# Patient Record
Sex: FEMALE | Race: WHITE | HISPANIC OR LATINO | Employment: FULL TIME | URBAN - METROPOLITAN AREA
[De-identification: names, ages, dates, MRNs, and addresses within clinical notes are randomized per-mention and may not be internally consistent; named-entity substitution may affect disease eponyms.]

---

## 2022-09-07 RX ORDER — LISINOPRIL 5 MG/1
5 TABLET ORAL DAILY
COMMUNITY
Start: 2022-02-04 | End: 2022-09-30

## 2022-09-07 RX ORDER — BIOTIN 10 MG
TABLET ORAL DAILY
COMMUNITY
End: 2023-03-30

## 2022-09-20 DIAGNOSIS — N18.2 CHRONIC KIDNEY DISEASE, STAGE II (MILD): Primary | ICD-10-CM

## 2022-09-29 PROBLEM — M32.14: Status: ACTIVE | Noted: 2022-09-29

## 2022-09-29 PROBLEM — R80.9 NON-NEPHROTIC RANGE PROTEINURIA: Status: ACTIVE | Noted: 2022-09-29

## 2022-09-29 PROBLEM — N18.2 CHRONIC KIDNEY DISEASE, STAGE II (MILD): Status: ACTIVE | Noted: 2022-09-29

## 2022-09-29 NOTE — PROGRESS NOTES
Pt Name:  Isidoro Faustin  Pt :  1990  Pt MRN:  [id number]    Date: 2022    Reason for visit:   Isidoro Faustin is a 32 y.o.  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     Chief Complaint:   The patient denies any complaints today.    HPI:  The patient is being seen today for follow up CKD and the status of her kidney function. Since the last visit, patient reports/medical record review reveals she is followed by Rheumatology and was last seen on 22 and was recommended to start on Plaquenil 200 mg po BID but pt. Unwilling to do so.  Dr. Lemus also suggested she have an ECHO to evaluate for PH but she was also no willing to have the ECHO either.  She has been attending a weight loss clinic. She was tried on Saxenda but was only able to tolerate it for approximately 1 week.  She is now trying diet and exercise.     The patient denies fatigue, weakness, poor appetite, metallic taste, nausea, cramping, chest pain, palpitations, SOB, orthopnea, PND, edema, trouble concentrating, decreased urination.      Home BPs when checked are <130/80 per patient. The patient is following a low sodium diet. The patient is avoiding NSAIDs. The patient is drinking 64 oz of water daily.     Since last visit on 22 patient reports above noted changes in medical history.    She was initially diagnosed with Lupus in .     History:   Past Medical History:   Diagnosis Date    Abnormal biopsy of kidney     systemic lupus erythematosus    Anemia, unspecified     Lupus nephritis, ISN/RPS class III     Rheumatic fever without heart involvement     Rheumatoid arthritis, unspecified     Systemic lupus erythematosus, organ or system involvement unspecified     Tubulo-interstitial nephritis, not specified as acute or chronic      History reviewed. No pertinent surgical history.  Family History   Problem  "Relation Age of Onset    Cancer Mother         Ovarian    Breast cancer Maternal Grandmother     Cancer Maternal Grandfather     Kidney disease Paternal Grandfather      Social History     Substance and Sexual Activity   Alcohol Use Not Currently     Social History     Substance and Sexual Activity   Drug Use Not Currently     Social History     Substance and Sexual Activity   Sexual Activity Yes     reports being sexually active.  Social History     Tobacco Use   Smoking Status Never   Smokeless Tobacco Never       Allergies:  Review of patient's allergies indicates:  No Known Allergies      Current Outpatient Medications:     biotin 10 mg Tab, Take by mouth once daily., Disp: , Rfl:     ROS:  Review of Systems   Constitutional:  Negative for activity change and appetite change.   HENT:  Negative for hearing loss.    Eyes:  Negative for visual disturbance.   Respiratory:  Negative for shortness of breath and wheezing.    Cardiovascular:  Negative for chest pain.   Gastrointestinal:  Negative for abdominal pain, diarrhea, nausea and vomiting.   Genitourinary:  Negative for decreased urine volume, dysuria, flank pain, frequency and urgency.   Neurological:  Negative for dizziness, weakness and headaches.     Physical Exam:   Vitals:   Vitals:    09/30/22 0913   BP: 123/86   Pulse: 77   SpO2: 99%   Weight: 89 kg (196 lb 3.2 oz)   Height: 5' 6" (1.676 m)   PainSc: 0-No pain     Body mass index is 31.67 kg/m².    Physical Exam  Vitals reviewed.   Constitutional:       General: She is not in acute distress.     Appearance: Normal appearance.   HENT:      Head: Normocephalic and atraumatic.      Mouth/Throat:      Mouth: Mucous membranes are moist.   Eyes:      Extraocular Movements: Extraocular movements intact.      Pupils: Pupils are equal, round, and reactive to light.   Cardiovascular:      Rate and Rhythm: Normal rate and regular rhythm.      Heart sounds: No murmur heard.  Pulmonary:      Effort: Pulmonary effort " is normal.      Breath sounds: No wheezing, rhonchi or rales.   Musculoskeletal:         General: No swelling.   Skin:     General: Skin is warm and dry.   Neurological:      Mental Status: She is alert and oriented to person, place, and time.   Psychiatric:         Mood and Affect: Mood normal.         Behavior: Behavior normal.       Labs/Tests:  Lab Results   Component Value Date    BUN 22 06/23/2022    CREATININE 0.86 06/23/2022    CREATININE 0.88 12/22/2021    GLUCOSE Negative 12/22/2021    EGFRNONAA 89 (L) 06/23/2022    EGFRNONAA 87 (L) 12/22/2021    ESTGFRAFRICA >90 06/23/2022    ESTGFRAFRICA >90 12/22/2021    HGB 12.9 06/23/2022    IRON 59 02/17/2022    TIBC 258 02/17/2022    FERRITIN 560.9 (H) 02/17/2022     Contains abnormal data Comprehensive metabolic panel  Order: [id number]   Ref Range & Units 2 d ago   ALBUMIN 3.7 - 4.9 g/dL 3.4 Low     Comment: Verified By Repeat Analysis   ALK PHOS 40 - 130 u/L 56.0    SGPT (ALT) 10 - 70 u/L 34.0    SGOT (AST) 10 - 40 u/L 31.0    BUN 7 - 25 mg/dL 16.0    CALCIUM 8.5 - 10.5 mg/dL 8.6    CHLORIDE 98 - 108 mmol/L 106.0    CO2 20 - 30 mmol/L 25.0    CREATININE 0.5 - 1.1 mg/dL 0.9    GLOBULIN 2 - 3.5 g/dL 3.8 High     GLUCOSE 65 - 99 mg/dL 74.0    Comment:    Normal Range reflects ADA recommendation.   Fasting? UNKNOWN   POTASSIUM 3.5 - 5.2 mmol/L 4.3    SODIUM 135 - 145 mmol/L 139.0    BILI TOTAL 0.2 - 1.3 mg/dL 0.5    PROTEIN, TOT 6.3 - 8.3 g/dL 7.2    A/G RATIO 1.1 - 2 Ratio 0.9 Low     ANION GAP 6 - 14 mmol/L 8.0    EGFR IF AFA >60.0 mL/min/1.73m^2 84.3    EGFR NOT AFA >60.0 mL/min/1.73m^2 84.3    BUN/CREAT Ratio 17.4      Component 09/27/22  01/11/22  07/08/21  10/08/20  02/28/19 05/11/18   PHOS P/S 3.3  3.5  3.8  3.6  4.1  4.2      CBC w/ diff  Order: [id number]   Ref Range & Units 2 d ago   WBC 3.8 - 10.8 K/UL 4.6    RBC 3.8 - 5.1 M/UL 4.5    HGB 11.5 - 15.5 g/dL 13.7    HCT 34 - 46 % 43.5    MCV 80 - 100 fL 96.0    MCH 27 - 33 pg 30.2    MCHC 32 - 36 g/dL 31.5 Low      RDW 11 - 15 % 12.8     - 400 K/.0      PTH, INTACT 16 - 77 pg/mL 64         Ref Range & Units 2 d ago   CREATIN UR 20 - 275 mg/dL 123    PROTEIN, TOTAL, RANDOM URINE 5 - 24 mg/dL 140 High     Comment: Test Performed at:   [facility] [address]  [id number]     LAUREN BAXTER MD   Fasting? UNKNOWN   PROTEIN/CREATININE RATIO 24 - 184 mg/g creat 1,138 High     PROTEIN/CREATININE RATIO 0.024 - 0.184 mg/mg creat 1.138 High           Diagnosis:  1. SLE glomerulonephritis syndrome, WHO class III  Renal Function Panel    Protein/Creatinine Ratio, Urine    Renal Function Panel    Protein/Creatinine Ratio, Urine      2. Chronic kidney disease, stage II (mild)  Hemoglobin    Renal Function Panel    PTH, Intact    Protein/Creatinine Ratio, Urine    Vitamin D    Hemoglobin    Renal Function Panel    PTH, Intact    Protein/Creatinine Ratio, Urine    Vitamin D      3. Non-nephrotic range proteinuria  Protein/Creatinine Ratio, Urine    Protein/Creatinine Ratio, Urine           Plan:  SLE glomerulonephritis syndrome, WHO class III  She is followed by Rheumatology and is unwilling to take Plaquenil 200 mg po BID as ordered / suggested.  She is encouraged to continue to f/u Rheumatology.     Chronic kidney disease, stage II (mild)  Serum Creatinine 0.9 / eGFR 84 - without Anemia - without SHPT -  Avoid NSAIDS, Assure 64 oz of water daily, Meds per med list above     Non-nephrotic range proteinuria  PROTEIN/CREATININE RATIO 0.024 - 0.184 mg/mg creat 1.138 High       She is unwilling to take the ACE inhibitor.  Patient has been re-educated on the indication and need to take an ACE.         My reconciliation of medication should not condone or support use of medications that have been previously prescribed for patients by other providers.  I am only documenting the current medications patients is taking and may change doses, add or discontinue medications based on information  provided by the patient.     The patient has been provided with their current level of kidney function including eGFR and creatinine.    We discussed the potential for common complications of CKD including anemia, electrolyte abnormalities, abnormal fluid balance, mineral bone disease and malnutrition.    We discussed strategies to slow the progression of their kidney disease including:  Avoid nephrotoxic agents. Avoid over-the-counter and prescription NSAIDs (Ibuprofren, Motrin, Naproxyn, Aleve, Mobic, Celebrex, Toradol, Advil). All of these can worsen kidney function, elevate BP, cause fluid retention/swelling and elevate potassium. Avoid iodine contrast agents and gadolinium, which can worsen kidney function and/or cause kidney failure. Avoid phosphosoda bowel preps which can worsen kidney function.  Work to improve modifiable risk factors. Aim for good control of blood glucose without episodes of hypoglycemia. Notify the provider managing your diabetes if your blood glucose < 60. Aim for good blood pressure control without episodes of hypotension. Call the office if your systolic blood pressure is consistently < 110. Aim for good control of your cholesterol.  AIC goal <7.0  BP goal <130/80        Keeping these in goal range will help prevent progression of cardiovascular disease            and chronic kidney disease.    We discussed dietary modifications:  Low sodium diet: 2 gm/d or less  Limit/avoid high potassium foods  Avoid potassium containing salt substitutes  Limit/avoid high phosphorus foods  Limit daily protein intake to 0.8-1 gm/kg of your ideal body weight.    We discussed lifestyle modifications:  Make sure you are drinking plenty of fluids--64 ounces (1/2 gallon) daily  Exercise at least 30 minutes 5 x per week (total 150 minutes per week), example brisk walking  Achieve and maintain a healthy weight (BMI 20-25)  Limit alcohol consumption to <2 drinks per day  Stop smoking  Make sure you stay  current on important vaccines-- pneumonia vaccines (Pneumovax and Prevnar), flu vaccine, Hepatitis B (especially patients nearing renal replacement therapy and planning hemodialysis) and Covid-19 vaccine.     Recommendations:  Monitor your BP at home daily and record.  Bring readings to your next appt.  Call the office if your BP is persistently >130/80.  Seek urgent medical attention with signs and symptoms of uremia - extreme weakness, fatigue, confusion, anorexia, metallic taste in mouth, hiccoughs, cramping, itching, chest pain, swelling, or trouble sleeping.    Follow Up:   Follow up in about 6 months (around 3/30/2023).

## 2022-09-29 NOTE — ASSESSMENT & PLAN NOTE
PROTEIN/CREATININE RATIO 0.024 - 0.184 mg/mg creat 1.138 High       She is unwilling to take the ACE inhibitor.  Patient has been re-educated on the indication and need to take an ACE.

## 2022-09-29 NOTE — ASSESSMENT & PLAN NOTE
Serum Creatinine 0.9 / eGFR 84 - without Anemia - without SHPT -  Avoid NSAIDS, Assure 64 oz of water daily, Meds per med list above

## 2022-09-29 NOTE — ASSESSMENT & PLAN NOTE
She is followed by Rheumatology and is unwilling to take Plaquenil 200 mg po BID as ordered / suggested.  She is encouraged to continue to f/u Rheumatology.

## 2022-09-30 ENCOUNTER — OFFICE VISIT (OUTPATIENT)
Dept: NEPHROLOGY | Facility: CLINIC | Age: 32
End: 2022-09-30
Payer: COMMERCIAL

## 2022-09-30 VITALS
OXYGEN SATURATION: 99 % | HEIGHT: 66 IN | HEART RATE: 77 BPM | WEIGHT: 196.19 LBS | DIASTOLIC BLOOD PRESSURE: 86 MMHG | SYSTOLIC BLOOD PRESSURE: 123 MMHG | BODY MASS INDEX: 31.53 KG/M2

## 2022-09-30 DIAGNOSIS — M32.14 SLE GLOMERULONEPHRITIS SYNDROME, WHO CLASS III: Primary | ICD-10-CM

## 2022-09-30 DIAGNOSIS — N18.2 CHRONIC KIDNEY DISEASE, STAGE II (MILD): ICD-10-CM

## 2022-09-30 DIAGNOSIS — R80.9 NON-NEPHROTIC RANGE PROTEINURIA: ICD-10-CM

## 2022-09-30 PROCEDURE — 3066F PR DOCUMENTATION OF TREATMENT FOR NEPHROPATHY: ICD-10-PCS | Mod: ,,, | Performed by: NURSE PRACTITIONER

## 2022-09-30 PROCEDURE — 1159F MED LIST DOCD IN RCRD: CPT | Mod: ,,, | Performed by: NURSE PRACTITIONER

## 2022-09-30 PROCEDURE — 3074F PR MOST RECENT SYSTOLIC BLOOD PRESSURE < 130 MM HG: ICD-10-PCS | Mod: ,,, | Performed by: NURSE PRACTITIONER

## 2022-09-30 PROCEDURE — 99213 PR OFFICE/OUTPT VISIT, EST, LEVL III, 20-29 MIN: ICD-10-PCS | Mod: ,,, | Performed by: NURSE PRACTITIONER

## 2022-09-30 PROCEDURE — 3008F PR BODY MASS INDEX (BMI) DOCUMENTED: ICD-10-PCS | Mod: ,,, | Performed by: NURSE PRACTITIONER

## 2022-09-30 PROCEDURE — 3079F DIAST BP 80-89 MM HG: CPT | Mod: ,,, | Performed by: NURSE PRACTITIONER

## 2022-09-30 PROCEDURE — 1159F PR MEDICATION LIST DOCUMENTED IN MEDICAL RECORD: ICD-10-PCS | Mod: ,,, | Performed by: NURSE PRACTITIONER

## 2022-09-30 PROCEDURE — 3074F SYST BP LT 130 MM HG: CPT | Mod: ,,, | Performed by: NURSE PRACTITIONER

## 2022-09-30 PROCEDURE — 1160F PR REVIEW ALL MEDS BY PRESCRIBER/CLIN PHARMACIST DOCUMENTED: ICD-10-PCS | Mod: ,,, | Performed by: NURSE PRACTITIONER

## 2022-09-30 PROCEDURE — 3079F PR MOST RECENT DIASTOLIC BLOOD PRESSURE 80-89 MM HG: ICD-10-PCS | Mod: ,,, | Performed by: NURSE PRACTITIONER

## 2022-09-30 PROCEDURE — 1160F RVW MEDS BY RX/DR IN RCRD: CPT | Mod: ,,, | Performed by: NURSE PRACTITIONER

## 2022-09-30 PROCEDURE — 99213 OFFICE O/P EST LOW 20 MIN: CPT | Mod: ,,, | Performed by: NURSE PRACTITIONER

## 2022-09-30 PROCEDURE — 3066F NEPHROPATHY DOC TX: CPT | Mod: ,,, | Performed by: NURSE PRACTITIONER

## 2022-09-30 PROCEDURE — 3008F BODY MASS INDEX DOCD: CPT | Mod: ,,, | Performed by: NURSE PRACTITIONER

## 2023-03-28 PROBLEM — E83.51 HYPOCALCEMIA: Status: ACTIVE | Noted: 2023-03-28

## 2023-03-28 PROBLEM — E88.09 HYPOALBUMINEMIA: Status: ACTIVE | Noted: 2023-03-28

## 2023-03-28 NOTE — ASSESSMENT & PLAN NOTE
Serum Creatinine 0.94 / eGFR 82 (0.9 / eGFR 84 on 9/27/22) - without Anemia - without SHPT -  Avoid NSAIDS, Assure 64 oz of water daily, no home meds.

## 2023-03-28 NOTE — PROGRESS NOTES
Pt Name:  Isidoro Faustin  Pt :  1990  Pt MRN:  [id number]    Date: 3/30/2023    Reason for visit:   Isidoro Faustin is a 33 y.o.  female presenting for SLE with nephritis (Biopsy proven Class III) with serum creatinine 0.94, eGFR 82 and Chronic Kidney Disease Stage 2.     Chief Complaint:   The patient denies any complaints today.    HPI:  The patient is being seen today for follow up CKD and the status of her kidney function. Since the last visit, patient reports/medical record review reveals she was seen by Dr. Lemus, Rheumatology on 10/13/22 who recommended she start Plaquenil 200 mg po BID but she declines to start Plaquenil.  The patient denies fatigue, weakness, poor appetite, metallic taste, nausea, cramping, chest pain, palpitations, SOB, orthopnea, PND, edema, trouble concentrating, decreased urination.      She does not routinely monitor her home Bps.  The patient is following a low sodium diet. The patient is avoiding NSAIDs. The patient is drinking 64 oz of water daily.     Since last visit on 22 patient reports above noted changes in medical history.      History:   Past Medical History:   Diagnosis Date    Abnormal biopsy of kidney     systemic lupus erythematosus    Anemia, unspecified     Hypocalcemia 3/28/2023    Lupus nephritis, ISN/RPS class III     Rheumatic fever without heart involvement     Rheumatoid arthritis, unspecified     Systemic lupus erythematosus, organ or system involvement unspecified     Tubulo-interstitial nephritis, not specified as acute or chronic      History reviewed. No pertinent surgical history.  Family History   Problem Relation Age of Onset    Cancer Mother         Ovarian    Breast cancer Maternal Grandmother     Cancer Maternal Grandfather     Kidney disease Paternal Grandfather      Social History     Substance and Sexual Activity   Alcohol Use Not Currently     Social History     Substance and Sexual Activity   Drug Use Not  "Currently     Social History     Substance and Sexual Activity   Sexual Activity Yes     reports being sexually active.  Social History     Tobacco Use   Smoking Status Never   Smokeless Tobacco Never       Allergies:  Review of patient's allergies indicates:  No Known Allergies      Current Outpatient Medications:     cholecalciferol, vitamin D3, (VITAMIN D3) 25 mcg (1,000 unit) capsule, Take 1 capsule (1,000 Units total) by mouth once daily., Disp: 30 capsule, Rfl: 0    ROS:  Review of Systems   Constitutional:  Negative for activity change and appetite change.   HENT:  Negative for hearing loss.    Eyes:  Negative for visual disturbance.   Respiratory:  Negative for shortness of breath and wheezing.    Cardiovascular:  Negative for chest pain.   Gastrointestinal:  Negative for abdominal pain, diarrhea, nausea and vomiting.   Genitourinary:  Negative for decreased urine volume, dysuria, flank pain, frequency and urgency.   Neurological:  Negative for dizziness, weakness and headaches.     Physical Exam:   Vitals:   Vitals:    03/30/23 1304   BP: 114/71   Pulse: 86   SpO2: 98%   Weight: 91.6 kg (202 lb)   Height: 5' 6" (1.676 m)     Body mass index is 32.6 kg/m².    Physical Exam  Vitals reviewed.   Constitutional:       General: She is not in acute distress.     Appearance: Normal appearance. She is obese.   HENT:      Head: Normocephalic and atraumatic.      Mouth/Throat:      Mouth: Mucous membranes are moist.   Eyes:      Extraocular Movements: Extraocular movements intact.      Pupils: Pupils are equal, round, and reactive to light.   Cardiovascular:      Rate and Rhythm: Normal rate and regular rhythm.      Heart sounds: No murmur heard.  Pulmonary:      Effort: Pulmonary effort is normal.      Breath sounds: No wheezing, rhonchi or rales.   Musculoskeletal:         General: No swelling.      Right lower leg: Edema present.      Left lower leg: Edema present.   Skin:     General: Skin is warm and dry. "   Neurological:      Mental Status: She is alert and oriented to person, place, and time.   Psychiatric:         Mood and Affect: Mood normal.         Behavior: Behavior normal.       Labs/Tests 3/23/23:    Contains abnormal data Renal function panel     Ref Range & Units 5 d ago   ALBUMIN 3.6 - 5.1 g/dL 3.0 Low     Comment: Test Performed at:   [facility] [address]  [id number]     LAUREN BAXTER MD   BUN/CREAT 6 - 22 (calc) NOT APPLICABLE    CALCIUM 8.6 - 10.2 mg/dL 8.3 Low     CO2 20 - 32 mmol/L 24    CHLORIDE 98 - 110 mmol/L 106    CREATININE 0.50 - 0.97 mg/dL 0.94    GLUCOSE 65 - 99 mg/dL 79    Comment: Fasting reference interval   PHOS P/S 2.5 - 4.5 mg/dL 3.0    POTASSIUM 3.5 - 5.3 mmol/L 4.2    SODIUM 135 - 146 mmol/L 139    BUN 7 - 25 mg/dL 14    EGFR > OR = 60 mL/min/1.73m2 82      PTH, INTACT   16 - 77 pg/mL 73      Component   Ref Range & Units 5 d ago   VIT D 25-OH   30 - 100 ng/mL 24 Low                           3/23/23  Component   Ref Range & Units 5 d ago   HGB   11.5 - 15.5 g/dL 12.9    HCT   34.0 - 46.0 % 39.3    Protein, total, random urine w/ creatinine 3/23/23    Component   Ref Range & Units 5 d ago   CREATIN UR   20 - 275 mg/dL 165    PROTEIN, TOTAL, RANDOM URINE   5 - 24 mg/dL 234 High     Comment: Results verified by repeat analysis on dilution.     Test Performed at:   [facility] [address]  [address]     LAUREN BAXTER MD   Fasting? NO   PROTEIN/CREATININE RATIO   24 - 184 mg/g creat 1,418 High     PROTEIN/CREATININE RATIO   0.024 - 0.184 mg/mg creat 1.418 High       Lab Results   Component Value Date    HGB 12.9 06/23/2022    HGB 10.4 (L) 12/22/2021    IRON 59 02/17/2022    TIBC 258 02/17/2022    FERRITIN 560.9 (H) 02/17/2022       Lab Results   Component Value Date    UPROTCREA 2.2 (H) 06/23/2022    UPROTCREA 2.2 (H) 12/22/2021    EXTIRONSATUR 23 (H) 02/17/2022    EXTIRONSATUR 7 (L) 01/13/2022          Diagnosis:  Plan and Assessment:  1. SLE glomerulonephritis syndrome, WHO class III  Assessment & Plan:  She is followed by Rheumatology and is unwilling to take Plaquenil 200 mg po BID as ordered / suggested.  She is encouraged to continue to f/u Rheumatology.     Orders:  -     Renal Function Panel; Future; Expected date: 09/30/2023    2. Chronic kidney disease, stage II (mild)  Assessment & Plan:  Serum Creatinine 0.94 / eGFR 82 (0.9 / eGFR 84 on 9/27/22) - without Anemia - without SHPT -  Avoid NSAIDS, Assure 64 oz of water daily, no home meds.     Orders:  -     Hemoglobin; Future; Expected date: 09/30/2023  -     Vitamin D; Future; Expected date: 09/30/2023  -     Renal Function Panel; Future; Expected date: 09/30/2023  -     PTH, Intact; Future; Expected date: 09/30/2023  -     Protein/Creatinine Ratio, Urine; Future; Expected date: 09/30/2023    3. Non-nephrotic range proteinuria  Assessment & Plan:  PROTEIN/CREATININE RATIO   0.024 - 0.184 mg/mg creat 1.418 High up from 1.138       Encouraged to try ACE but declines at this time.     She is unwilling to take the ACE inhibitor.  Patient has been re-educated on the indication and need to take an ACE.     Orders:  -     Protein/Creatinine Ratio, Urine; Future; Expected date: 09/30/2023    4. Hypoalbuminemia  Assessment & Plan:  Albumin 3.0 - Does not take any medications -     Orders:  -     Renal Function Panel; Future; Expected date: 09/30/2023    5. Vitamin D deficiency, unspecified  Assessment & Plan:  Vitamin D 24 - start Vitamin D3 1000 units po daily     Orders:  -     Vitamin D; Future; Expected date: 09/30/2023    Other orders  -     cholecalciferol, vitamin D3, (VITAMIN D3) 25 mcg (1,000 unit) capsule; Take 1 capsule (1,000 Units total) by mouth once daily.  Dispense: 30 capsule; Refill: 0           My reconciliation of medication should not condone or support use of medications that have been previously prescribed for patients by other  providers.  I am only documenting the current medications patients is taking and may change doses, add or discontinue medications based on information provided by the patient.     The patient has been provided with their current level of kidney function including eGFR and creatinine.    We discussed the potential for common complications of CKD including anemia, electrolyte abnormalities, abnormal fluid balance, mineral bone disease and malnutrition.    We discussed strategies to slow the progression of their kidney disease including:  Avoid nephrotoxic agents. Avoid over-the-counter and prescription NSAIDs (Ibuprofren, Motrin, Naproxyn, Aleve, Mobic, Celebrex, Toradol, Advil). All of these can worsen kidney function, elevate BP, cause fluid retention/swelling and elevate potassium. Avoid iodine contrast agents and gadolinium, which can worsen kidney function and/or cause kidney failure. Avoid phosphosoda bowel preps which can worsen kidney function.  Work to improve modifiable risk factors. Aim for good control of blood glucose without episodes of hypoglycemia. Notify the provider managing your diabetes if your blood glucose < 60. Aim for good blood pressure control without episodes of hypotension. Call the office if your systolic blood pressure is consistently < 110. Aim for good control of your cholesterol.  AIC goal <7.0  BP goal <130/80        Keeping these in goal range will help prevent progression of cardiovascular disease            and chronic kidney disease.    We discussed dietary modifications:  Low sodium diet: 2 gm/d or less  Limit/avoid high potassium foods  Avoid potassium containing salt substitutes  Limit/avoid high phosphorus foods  Limit daily protein intake to 0.8-1 gm/kg of your ideal body weight.    We discussed lifestyle modifications:  Make sure you are drinking plenty of fluids--64 ounces (1/2 gallon) daily  Exercise at least 30 minutes 5 x per week (total 150 minutes per week), example  brisk walking  Achieve and maintain a healthy weight (BMI 20-25)  Limit alcohol consumption to <2 drinks per day  Stop smoking  Make sure you stay current on important vaccines-- pneumonia vaccines (Pneumovax and Prevnar), flu vaccine, Hepatitis B (especially patients nearing renal replacement therapy and planning hemodialysis) and Covid-19 vaccine.     Recommendations:  Monitor your BP at home daily and record.  Bring readings to your next appt.  Call the office if your BP is persistently >130/80.  Seek urgent medical attention with signs and symptoms of uremia - extreme weakness, fatigue, confusion, anorexia, metallic taste in mouth, hiccoughs, cramping, itching, chest pain, swelling, or trouble sleeping.    Follow Up:   Follow up in about 6 months (around 9/30/2023).

## 2023-03-28 NOTE — ASSESSMENT & PLAN NOTE
PROTEIN/CREATININE RATIO   0.024 - 0.184 mg/mg creat 1.418 High up from 1.138       Encouraged to try ACE but declines at this time.     She is unwilling to take the ACE inhibitor.  Patient has been re-educated on the indication and need to take an ACE.

## 2023-03-30 ENCOUNTER — OFFICE VISIT (OUTPATIENT)
Dept: NEPHROLOGY | Facility: CLINIC | Age: 33
End: 2023-03-30
Payer: COMMERCIAL

## 2023-03-30 VITALS
BODY MASS INDEX: 32.47 KG/M2 | SYSTOLIC BLOOD PRESSURE: 114 MMHG | HEART RATE: 86 BPM | WEIGHT: 202 LBS | HEIGHT: 66 IN | OXYGEN SATURATION: 98 % | DIASTOLIC BLOOD PRESSURE: 71 MMHG

## 2023-03-30 DIAGNOSIS — M32.14 SLE GLOMERULONEPHRITIS SYNDROME, WHO CLASS III: ICD-10-CM

## 2023-03-30 DIAGNOSIS — E55.9 VITAMIN D DEFICIENCY, UNSPECIFIED: ICD-10-CM

## 2023-03-30 DIAGNOSIS — R80.9 NON-NEPHROTIC RANGE PROTEINURIA: ICD-10-CM

## 2023-03-30 DIAGNOSIS — N18.2 CHRONIC KIDNEY DISEASE, STAGE II (MILD): ICD-10-CM

## 2023-03-30 DIAGNOSIS — E88.09 HYPOALBUMINEMIA: ICD-10-CM

## 2023-03-30 PROBLEM — E83.51 HYPOCALCEMIA: Status: RESOLVED | Noted: 2023-03-28 | Resolved: 2023-03-30

## 2023-03-30 PROCEDURE — 3074F SYST BP LT 130 MM HG: CPT | Mod: S$GLB,,, | Performed by: NURSE PRACTITIONER

## 2023-03-30 PROCEDURE — 3078F PR MOST RECENT DIASTOLIC BLOOD PRESSURE < 80 MM HG: ICD-10-PCS | Mod: S$GLB,,, | Performed by: NURSE PRACTITIONER

## 2023-03-30 PROCEDURE — 99213 PR OFFICE/OUTPT VISIT, EST, LEVL III, 20-29 MIN: ICD-10-PCS | Mod: S$GLB,,, | Performed by: NURSE PRACTITIONER

## 2023-03-30 PROCEDURE — 3078F DIAST BP <80 MM HG: CPT | Mod: S$GLB,,, | Performed by: NURSE PRACTITIONER

## 2023-03-30 PROCEDURE — 3074F PR MOST RECENT SYSTOLIC BLOOD PRESSURE < 130 MM HG: ICD-10-PCS | Mod: S$GLB,,, | Performed by: NURSE PRACTITIONER

## 2023-03-30 PROCEDURE — 3008F BODY MASS INDEX DOCD: CPT | Mod: S$GLB,,, | Performed by: NURSE PRACTITIONER

## 2023-03-30 PROCEDURE — 3066F NEPHROPATHY DOC TX: CPT | Mod: S$GLB,,, | Performed by: NURSE PRACTITIONER

## 2023-03-30 PROCEDURE — 1160F PR REVIEW ALL MEDS BY PRESCRIBER/CLIN PHARMACIST DOCUMENTED: ICD-10-PCS | Mod: S$GLB,,, | Performed by: NURSE PRACTITIONER

## 2023-03-30 PROCEDURE — 1159F MED LIST DOCD IN RCRD: CPT | Mod: S$GLB,,, | Performed by: NURSE PRACTITIONER

## 2023-03-30 PROCEDURE — 3008F PR BODY MASS INDEX (BMI) DOCUMENTED: ICD-10-PCS | Mod: S$GLB,,, | Performed by: NURSE PRACTITIONER

## 2023-03-30 PROCEDURE — 1160F RVW MEDS BY RX/DR IN RCRD: CPT | Mod: S$GLB,,, | Performed by: NURSE PRACTITIONER

## 2023-03-30 PROCEDURE — 99213 OFFICE O/P EST LOW 20 MIN: CPT | Mod: S$GLB,,, | Performed by: NURSE PRACTITIONER

## 2023-03-30 PROCEDURE — 3066F PR DOCUMENTATION OF TREATMENT FOR NEPHROPATHY: ICD-10-PCS | Mod: S$GLB,,, | Performed by: NURSE PRACTITIONER

## 2023-03-30 PROCEDURE — 1159F PR MEDICATION LIST DOCUMENTED IN MEDICAL RECORD: ICD-10-PCS | Mod: S$GLB,,, | Performed by: NURSE PRACTITIONER

## 2023-03-30 RX ORDER — VIT C/E/ZN/COPPR/LUTEIN/ZEAXAN 250MG-90MG
1000 CAPSULE ORAL DAILY
Qty: 30 CAPSULE | Refills: 0 | Status: SHIPPED | OUTPATIENT
Start: 2023-03-30 | End: 2023-10-31

## 2023-05-03 DIAGNOSIS — N18.2 CHRONIC KIDNEY DISEASE, STAGE II (MILD): ICD-10-CM

## 2023-05-03 DIAGNOSIS — N18.2 CHRONIC KIDNEY DISEASE, STAGE II (MILD): Primary | ICD-10-CM

## 2023-05-03 DIAGNOSIS — M32.14 SLE GLOMERULONEPHRITIS SYNDROME, WHO CLASS III: ICD-10-CM

## 2023-05-03 DIAGNOSIS — M32.14 SLE GLOMERULONEPHRITIS SYNDROME, WHO CLASS III: Primary | ICD-10-CM

## 2023-05-03 DIAGNOSIS — E88.09 HYPOALBUMINEMIA: ICD-10-CM

## 2023-05-03 DIAGNOSIS — R80.9 NON-NEPHROTIC RANGE PROTEINURIA: ICD-10-CM

## 2023-05-09 ENCOUNTER — DOCUMENTATION ONLY (OUTPATIENT)
Dept: NEPHROLOGY | Facility: CLINIC | Age: 33
End: 2023-05-09
Payer: COMMERCIAL

## 2023-05-09 NOTE — PROGRESS NOTES
"TC to patient to discuss labs; she c/o swelling in her lower extremities and feeling "heavy" all-over.     Her protein / creatinine ratio has increased from 1418 to 1575; serum creatinine 0.94 to 0.8 now; eGFR from 82 up to 93.5; Hgb did drop from 12.9 down to 11.7.      It is time for her to start her menstrual period.  I have advised her to start an OTC vitamin and iron pill daily.     She is advised that she is probably having a Lupus flare and should strongly consider starting Plaquenil.  She again states she does not want to start plaquenil.  I have suggested rest and adequate hydration and should her symptoms worsen to contact me and we will recheck labs.  Otherwise I will see her at her regularly scheduled appointment.   She thanked me for calling.     "

## 2023-10-24 DIAGNOSIS — N18.2 CHRONIC KIDNEY DISEASE, STAGE II (MILD): Primary | ICD-10-CM

## 2023-10-27 PROBLEM — N18.1 CKD (CHRONIC KIDNEY DISEASE) STAGE 1, GFR 90 ML/MIN OR GREATER: Status: ACTIVE | Noted: 2022-09-29

## 2023-10-27 NOTE — ASSESSMENT & PLAN NOTE
Serum Creatinine 0.86 / eGFR > 90; (0.94 / eGFR 82 on 3/23/23 and 0.9 / eGFR 84 on 9/27/22) - without Anemia - without SHPT -  Avoid NSAIDS, Assure 64 oz of water daily, no home meds.

## 2023-10-27 NOTE — PROGRESS NOTES
Pt Name:  Isidoro Faustin  Pt :  1990  Pt MRN:  [id number]    Date: 10/31/2023    Reason for visit:   Isidoro Faustin is a 33 y.o.  female presenting for SLE with Nephritis (Biopsy Proven Class III)  with serum creatinine 0.86, eGFR >90 and Chronic Kidney Disease Stage I.     Chief Complaint:   The patient denies any complaints today.    HPI:  The patient is being seen today for follow up CKD and the status of her kidney function. Since the last visit, patient reports/medical record review reveals she was seen by Dr. Lemus on 23 and was advised to start Plaquenil but again declined the offer.   The patient denies fatigue, weakness, poor appetite, metallic taste, nausea, cramping, chest pain, palpitations, SOB, orthopnea, PND, edema, trouble concentrating, decreased urination.      She does not monitor her BP at home.  The patient is following a low sodium diet. The patient is avoiding NSAIDs. The patient is drinking 72 oz of water daily.     Since last visit on 3/30/23 patient reports above noted changes in medical history.      History:   Past Medical History:   Diagnosis Date    Abnormal biopsy of kidney     systemic lupus erythematosus    Anemia, unspecified     Hypocalcemia 3/28/2023    Lupus nephritis, ISN/RPS class III     Rheumatic fever without heart involvement     Rheumatoid arthritis, unspecified     Systemic lupus erythematosus, organ or system involvement unspecified     Tubulo-interstitial nephritis, not specified as acute or chronic      History reviewed. No pertinent surgical history.  Family History   Problem Relation Age of Onset    Cancer Mother         Ovarian    Breast cancer Maternal Grandmother     Cancer Maternal Grandfather     Kidney disease Paternal Grandfather      Social History     Substance and Sexual Activity   Alcohol Use Not Currently     Social History     Substance and Sexual Activity   Drug Use Not Currently     Social History     Substance  "and Sexual Activity   Sexual Activity Yes     reports being sexually active.  Social History     Tobacco Use   Smoking Status Never   Smokeless Tobacco Never       Allergies:  Review of patient's allergies indicates:  No Known Allergies      Current Outpatient Medications:     azelaic acid (AZELEX) 15 % gel, Apply 1 application  topically 2 (two) times daily., Disp: , Rfl:     fluticasone propionate (FLONASE) 50 mcg/actuation nasal spray, USE 1 SPRAY IN EACH NOSTRIL TWICE A DAY AS NEEDED FOR ALLERGIES/CONGESTION, Disp: , Rfl:     loratadine-pseudoephedrine 5-120 mg (CLARITIN-D 12-HOUR) 5-120 mg per tablet, Take 1 tablet every 12 hours for congestion/sinus pressure/drainage as needed, Disp: , Rfl:     mupirocin (BACTROBAN) 2 % ointment, Apply topically 3 (three) times daily., Disp: , Rfl:     semaglutide, weight loss, (WEGOVY) 0.25 mg/0.5 mL PnIj, Inject 0.25 mg into the skin every 7 days., Disp: , Rfl:     sulfamethoxazole-trimethoprim 800-160mg (BACTRIM DS) 800-160 mg Tab, Take 1 tablet by mouth 2 (two) times daily., Disp: , Rfl:     ROS:  Review of Systems   Constitutional:  Negative for activity change and appetite change.   HENT:  Negative for hearing loss.    Eyes:  Negative for visual disturbance.   Respiratory:  Negative for shortness of breath and wheezing.    Cardiovascular:  Negative for chest pain.   Gastrointestinal:  Negative for abdominal pain, diarrhea, nausea and vomiting.   Genitourinary:  Negative for decreased urine volume, dysuria, flank pain, frequency and urgency.   Neurological:  Negative for dizziness, weakness and headaches.       Physical Exam:   Vitals:   Vitals:    10/31/23 1512   BP: 116/70   Pulse: 66   SpO2: 98%   Weight: 93.9 kg (207 lb)   Height: 5' 6" (1.676 m)     Body mass index is 33.41 kg/m².    Physical Exam  Vitals reviewed.   Constitutional:       General: She is not in acute distress.     Appearance: Normal appearance.   HENT:      Head: Normocephalic and atraumatic.      " Mouth/Throat:      Mouth: Mucous membranes are moist.   Eyes:      Extraocular Movements: Extraocular movements intact.      Pupils: Pupils are equal, round, and reactive to light.   Cardiovascular:      Rate and Rhythm: Normal rate and regular rhythm.      Heart sounds: No murmur heard.  Pulmonary:      Effort: Pulmonary effort is normal.      Breath sounds: No wheezing, rhonchi or rales.   Musculoskeletal:         General: No swelling.   Skin:     General: Skin is warm and dry.   Neurological:      Mental Status: She is alert and oriented to person, place, and time.   Psychiatric:         Mood and Affect: Mood normal.         Behavior: Behavior normal.         Labs/Tests 10/26/23:    RENAL FUNCTION PANEL     Ref Range & Units 1 d ago   Sodium 136 - 147 mmol/L 138    Potassium 3.5 - 5.1 mmol/L 4.0    Chloride 98 - 107 mmol/L 106    CO2 20 - 31 mmol/L 26    Glucose 70 - 99 mg/dL 78    BUN 9 - 23 mg/dL 14    Creatinine 0.55 - 1.02 mg/dL 0.86    Calcium 8.3 - 10.6 mg/dL 8.3    Phosphorus Level 2.4 - 5.1 mg/dL 3.5    Albumin Level 3.2 - 4.8 g/dL 3.2    Anion Gap 5.0 - 15.0 mmol/L 6.4    eGFR CKD-EPI >90 ml/min/1.73m2 90 Low       HEMOGLOBIN     Ref Range & Units 1 d ago   Hemoglobin 11.3 - 15.4 g/dL 12.2        Lab Results   Component Value Date    PTH 62 10/26/2023    IRON 59 02/17/2022    TIBC 258 02/17/2022    FERRITIN 560.9 (H) 02/17/2022    [id number] 25.6 (L) 10/26/2023         Lab Results   Component Value Date    UPROTCREA 2.1 (H) 10/26/2023    UPROTCREA 1.8 (H) 05/24/2023    UPROTCREA 2.2 (H) 06/23/2022    EXTIRONSATUR 23 (H) 02/17/2022    EXTIRONSATUR 7 (L) 01/13/2022         Diagnosis:  Plan and Assessment:  1. SLE glomerulonephritis syndrome, WHO class III  Assessment & Plan:  She is followed by Rheumatology and is unwilling to take Plaquenil 200 mg po BID as ordered / suggested.  She was seen by Dr. Lemus on 5/24/23 and it was advised to start Placquenil but again she declined the offer. She is  encouraged to continue to f/u Rheumatology.     Orders:  -     Renal Function Panel; Future; Expected date: 04/30/2024    2. CKD (chronic kidney disease) stage 1, GFR 90 ml/min or greater  Assessment & Plan:  Serum Creatinine 0.86 / eGFR > 90; (0.94 / eGFR 82 on 3/23/23 and 0.9 / eGFR 84 on 9/27/22) - without Anemia - without SHPT -  Avoid NSAIDS, Assure 64 oz of water daily, no home meds.     Orders:  -     Hemoglobin; Future; Expected date: 04/30/2024  -     Renal Function Panel; Future; Expected date: 04/30/2024  -     PTH, Intact; Future; Expected date: 04/30/2024    3. Non-nephrotic range proteinuria  Assessment & Plan:    2100 mg - up from 1800 mg on 5/24/23 with Dr. Lemus     Encouraged to try ACE but declines at this time.     She is unwilling to take the ACE inhibitor.  Patient has been re-educated on the indication and need to take an ACE.     Orders:  -     Protein/Creatinine Ratio, Urine; Future; Expected date: 04/30/2024    4. Hypoalbuminemia  Assessment & Plan:  Albumin 3.2 - up from 3.0 - Does not take any medications -     Orders:  -     Renal Function Panel; Future; Expected date: 04/30/2024    5. Vitamin D deficiency, unspecified  Assessment & Plan:  Vitamin D 25.6 - up from 24 - suggested she start Vitamin D3 1000 units po daily but she does not want to start any medications.     Orders:  -     Vitamin D; Future; Expected date: 04/30/2024           My reconciliation of medication should not condone or support use of medications that have been previously prescribed for patients by other providers.  I am only documenting the current medications patients is taking and may change doses, add or discontinue medications based on information provided by the patient.     The patient has been provided with their current level of kidney function including eGFR and creatinine.    We discussed the potential for common complications of CKD including anemia, electrolyte abnormalities, abnormal fluid balance,  mineral bone disease and malnutrition.    We discussed strategies to slow the progression of their kidney disease including:  Avoid nephrotoxic agents. Avoid over-the-counter and prescription NSAIDs (Ibuprofren, Motrin, Naproxyn, Aleve, Mobic, Celebrex, Toradol, Advil). All of these can worsen kidney function, elevate BP, cause fluid retention/swelling and elevate potassium. Avoid iodine contrast agents and gadolinium, which can worsen kidney function and/or cause kidney failure. Avoid phosphosoda bowel preps which can worsen kidney function.  Work to improve modifiable risk factors. Aim for good control of blood glucose without episodes of hypoglycemia. Notify the provider managing your diabetes if your blood glucose < 60. Aim for good blood pressure control without episodes of hypotension. Call the office if your systolic blood pressure is consistently < 110. Aim for good control of your cholesterol.  AIC goal <7.0  BP goal <130/80        Keeping these in goal range will help prevent progression of cardiovascular disease            and chronic kidney disease.    We discussed dietary modifications:  Low sodium diet: 2 gm/d or less  Limit/avoid high potassium foods  Avoid potassium containing salt substitutes  Limit/avoid high phosphorus foods  Limit daily protein intake to 0.8-1 gm/kg of your ideal body weight.    We discussed lifestyle modifications:  Make sure you are drinking plenty of fluids--64 ounces (1/2 gallon) daily  Exercise at least 30 minutes 5 x per week (total 150 minutes per week), example brisk walking  Achieve and maintain a healthy weight (BMI 20-25)  Limit alcohol consumption to <2 drinks per day  Stop smoking  Make sure you stay current on important vaccines-- pneumonia vaccines (Pneumovax and Prevnar), flu vaccine, Hepatitis B (especially patients nearing renal replacement therapy and planning hemodialysis) and Covid-19 vaccine.     Recommendations:  Monitor your BP at home daily and  record.  Bring readings to your next appt.  Call the office if your BP is persistently >130/80.  Seek urgent medical attention with signs and symptoms of uremia - extreme weakness, fatigue, confusion, anorexia, metallic taste in mouth, hiccoughs, cramping, itching, chest pain, swelling, or trouble sleeping.    Follow Up:   Follow up in about 6 months (around 4/30/2024).

## 2023-10-27 NOTE — ASSESSMENT & PLAN NOTE
2100 mg - up from 1800 mg on 5/24/23 with Dr. Lemus     Encouraged to try ACE but declines at this time.     She is unwilling to take the ACE inhibitor.  Patient has been re-educated on the indication and need to take an ACE.

## 2023-10-27 NOTE — ASSESSMENT & PLAN NOTE
She is followed by Rheumatology and is unwilling to take Plaquenil 200 mg po BID as ordered / suggested.  She was seen by Dr. Lemus on 5/24/23 and it was advised to start Placquenil but again she declined the offer. She is encouraged to continue to f/u Rheumatology.

## 2023-10-27 NOTE — ASSESSMENT & PLAN NOTE
Vitamin D 25.6 - up from 24 - suggested she start Vitamin D3 1000 units po daily but she does not want to start any medications.

## 2023-10-31 ENCOUNTER — OFFICE VISIT (OUTPATIENT)
Dept: NEPHROLOGY | Facility: CLINIC | Age: 33
End: 2023-10-31
Payer: COMMERCIAL

## 2023-10-31 VITALS
HEART RATE: 66 BPM | DIASTOLIC BLOOD PRESSURE: 70 MMHG | SYSTOLIC BLOOD PRESSURE: 116 MMHG | WEIGHT: 207 LBS | BODY MASS INDEX: 33.27 KG/M2 | HEIGHT: 66 IN | OXYGEN SATURATION: 98 %

## 2023-10-31 DIAGNOSIS — E55.9 VITAMIN D DEFICIENCY, UNSPECIFIED: ICD-10-CM

## 2023-10-31 DIAGNOSIS — N18.1 CKD (CHRONIC KIDNEY DISEASE) STAGE 1, GFR 90 ML/MIN OR GREATER: ICD-10-CM

## 2023-10-31 DIAGNOSIS — M32.14 SLE GLOMERULONEPHRITIS SYNDROME, WHO CLASS III: ICD-10-CM

## 2023-10-31 DIAGNOSIS — E88.09 HYPOALBUMINEMIA: ICD-10-CM

## 2023-10-31 DIAGNOSIS — R80.9 NON-NEPHROTIC RANGE PROTEINURIA: ICD-10-CM

## 2023-10-31 PROCEDURE — 99213 OFFICE O/P EST LOW 20 MIN: CPT | Mod: S$GLB,,, | Performed by: NURSE PRACTITIONER

## 2023-10-31 PROCEDURE — 3008F BODY MASS INDEX DOCD: CPT | Mod: S$GLB,,, | Performed by: NURSE PRACTITIONER

## 2023-10-31 PROCEDURE — 3008F PR BODY MASS INDEX (BMI) DOCUMENTED: ICD-10-PCS | Mod: S$GLB,,, | Performed by: NURSE PRACTITIONER

## 2023-10-31 PROCEDURE — 3066F PR DOCUMENTATION OF TREATMENT FOR NEPHROPATHY: ICD-10-PCS | Mod: S$GLB,,, | Performed by: NURSE PRACTITIONER

## 2023-10-31 PROCEDURE — 99213 PR OFFICE/OUTPT VISIT, EST, LEVL III, 20-29 MIN: ICD-10-PCS | Mod: S$GLB,,, | Performed by: NURSE PRACTITIONER

## 2023-10-31 PROCEDURE — 3078F DIAST BP <80 MM HG: CPT | Mod: S$GLB,,, | Performed by: NURSE PRACTITIONER

## 2023-10-31 PROCEDURE — 3066F NEPHROPATHY DOC TX: CPT | Mod: S$GLB,,, | Performed by: NURSE PRACTITIONER

## 2023-10-31 PROCEDURE — 1159F MED LIST DOCD IN RCRD: CPT | Mod: S$GLB,,, | Performed by: NURSE PRACTITIONER

## 2023-10-31 PROCEDURE — 3078F PR MOST RECENT DIASTOLIC BLOOD PRESSURE < 80 MM HG: ICD-10-PCS | Mod: S$GLB,,, | Performed by: NURSE PRACTITIONER

## 2023-10-31 PROCEDURE — 3074F PR MOST RECENT SYSTOLIC BLOOD PRESSURE < 130 MM HG: ICD-10-PCS | Mod: S$GLB,,, | Performed by: NURSE PRACTITIONER

## 2023-10-31 PROCEDURE — 3074F SYST BP LT 130 MM HG: CPT | Mod: S$GLB,,, | Performed by: NURSE PRACTITIONER

## 2023-10-31 PROCEDURE — 1160F RVW MEDS BY RX/DR IN RCRD: CPT | Mod: S$GLB,,, | Performed by: NURSE PRACTITIONER

## 2023-10-31 PROCEDURE — 1160F PR REVIEW ALL MEDS BY PRESCRIBER/CLIN PHARMACIST DOCUMENTED: ICD-10-PCS | Mod: S$GLB,,, | Performed by: NURSE PRACTITIONER

## 2023-10-31 PROCEDURE — 1159F PR MEDICATION LIST DOCUMENTED IN MEDICAL RECORD: ICD-10-PCS | Mod: S$GLB,,, | Performed by: NURSE PRACTITIONER

## 2023-10-31 RX ORDER — SULFAMETHOXAZOLE AND TRIMETHOPRIM 800; 160 MG/1; MG/1
1 TABLET ORAL 2 TIMES DAILY
COMMUNITY
Start: 2023-10-04

## 2023-10-31 RX ORDER — FLUTICASONE PROPIONATE 50 MCG
SPRAY, SUSPENSION (ML) NASAL
COMMUNITY
Start: 2023-07-10

## 2023-10-31 RX ORDER — MUPIROCIN 20 MG/G
OINTMENT TOPICAL 3 TIMES DAILY
COMMUNITY
Start: 2023-07-01

## 2023-10-31 RX ORDER — AZELAIC ACID 0.15 G/G
1 GEL TOPICAL 2 TIMES DAILY
COMMUNITY
Start: 2023-10-06

## 2023-10-31 RX ORDER — SEMAGLUTIDE 0.25 MG/.5ML
0.25 INJECTION, SOLUTION SUBCUTANEOUS
COMMUNITY
Start: 2023-10-27 | End: 2023-11-26